# Patient Record
Sex: FEMALE | Race: OTHER | HISPANIC OR LATINO | Employment: UNEMPLOYED | URBAN - METROPOLITAN AREA
[De-identification: names, ages, dates, MRNs, and addresses within clinical notes are randomized per-mention and may not be internally consistent; named-entity substitution may affect disease eponyms.]

---

## 2018-11-17 ENCOUNTER — APPOINTMENT (EMERGENCY)
Dept: RADIOLOGY | Facility: HOSPITAL | Age: 63
End: 2018-11-17
Payer: COMMERCIAL

## 2018-11-17 ENCOUNTER — HOSPITAL ENCOUNTER (EMERGENCY)
Facility: HOSPITAL | Age: 63
Discharge: HOME | End: 2018-11-17
Attending: EMERGENCY MEDICINE | Admitting: EMERGENCY MEDICINE
Payer: COMMERCIAL

## 2018-11-17 ENCOUNTER — APPOINTMENT (EMERGENCY)
Dept: CT IMAGING | Facility: HOSPITAL | Age: 63
End: 2018-11-17
Payer: COMMERCIAL

## 2018-11-17 VITALS
HEIGHT: 64 IN | OXYGEN SATURATION: 97 % | RESPIRATION RATE: 18 BRPM | BODY MASS INDEX: 24.28 KG/M2 | SYSTOLIC BLOOD PRESSURE: 122 MMHG | TEMPERATURE: 97.8 F | HEART RATE: 74 BPM | DIASTOLIC BLOOD PRESSURE: 70 MMHG | WEIGHT: 142.2 LBS

## 2018-11-17 DIAGNOSIS — S01.01XA LACERATION OF SCALP, INITIAL ENCOUNTER: ICD-10-CM

## 2018-11-17 DIAGNOSIS — F10.929 ALCOHOL INTOXICATION (HCC): ICD-10-CM

## 2018-11-17 DIAGNOSIS — S09.90XA INJURY OF HEAD, INITIAL ENCOUNTER: Primary | ICD-10-CM

## 2018-11-17 LAB
ABO GROUP BLD: NORMAL
ANION GAP SERPL CALCULATED.3IONS-SCNC: 11 MMOL/L (ref 4–13)
APTT PPP: 29 SECONDS (ref 26–38)
BASOPHILS # BLD AUTO: 0.04 THOUSANDS/ΜL (ref 0–0.1)
BASOPHILS NFR BLD AUTO: 1 % (ref 0–1)
BLD GP AB SCN SERPL QL: NEGATIVE
BUN SERPL-MCNC: 11 MG/DL (ref 5–25)
CALCIUM SERPL-MCNC: 8.8 MG/DL (ref 8.3–10.1)
CHLORIDE SERPL-SCNC: 101 MMOL/L (ref 100–108)
CO2 SERPL-SCNC: 29 MMOL/L (ref 21–32)
CREAT SERPL-MCNC: 0.54 MG/DL (ref 0.6–1.3)
EOSINOPHIL # BLD AUTO: 0.13 THOUSAND/ΜL (ref 0–0.61)
EOSINOPHIL NFR BLD AUTO: 2 % (ref 0–6)
ERYTHROCYTE [DISTWIDTH] IN BLOOD BY AUTOMATED COUNT: 12.2 % (ref 11.6–15.1)
ETHANOL SERPL-MCNC: 232 MG/DL (ref 0–3)
GFR SERPL CREATININE-BSD FRML MDRD: 101 ML/MIN/1.73SQ M
GLUCOSE SERPL-MCNC: 119 MG/DL (ref 65–140)
HCT VFR BLD AUTO: 40.3 % (ref 34.8–46.1)
HGB BLD-MCNC: 13.6 G/DL (ref 11.5–15.4)
IMM GRANULOCYTES # BLD AUTO: 0.03 THOUSAND/UL (ref 0–0.2)
IMM GRANULOCYTES NFR BLD AUTO: 0 % (ref 0–2)
INR PPP: 0.85 (ref 0.86–1.17)
LYMPHOCYTES # BLD AUTO: 2.34 THOUSANDS/ΜL (ref 0.6–4.47)
LYMPHOCYTES NFR BLD AUTO: 30 % (ref 14–44)
MCH RBC QN AUTO: 33.5 PG (ref 26.8–34.3)
MCHC RBC AUTO-ENTMCNC: 33.7 G/DL (ref 31.4–37.4)
MCV RBC AUTO: 99 FL (ref 82–98)
MONOCYTES # BLD AUTO: 0.75 THOUSAND/ΜL (ref 0.17–1.22)
MONOCYTES NFR BLD AUTO: 10 % (ref 4–12)
NEUTROPHILS # BLD AUTO: 4.58 THOUSANDS/ΜL (ref 1.85–7.62)
NEUTS SEG NFR BLD AUTO: 57 % (ref 43–75)
NRBC BLD AUTO-RTO: 0 /100 WBCS
PLATELET # BLD AUTO: 217 THOUSANDS/UL (ref 149–390)
PMV BLD AUTO: 9.6 FL (ref 8.9–12.7)
POTASSIUM SERPL-SCNC: 3.2 MMOL/L (ref 3.5–5.3)
PROTHROMBIN TIME: 11.6 SECONDS (ref 11.8–14.2)
RBC # BLD AUTO: 4.06 MILLION/UL (ref 3.81–5.12)
RH BLD: POSITIVE
SODIUM SERPL-SCNC: 141 MMOL/L (ref 136–145)
SPECIMEN EXPIRATION DATE: NORMAL
TROPONIN I SERPL-MCNC: <0.02 NG/ML
WBC # BLD AUTO: 7.87 THOUSAND/UL (ref 4.31–10.16)

## 2018-11-17 PROCEDURE — 80320 DRUG SCREEN QUANTALCOHOLS: CPT | Performed by: EMERGENCY MEDICINE

## 2018-11-17 PROCEDURE — 90471 IMMUNIZATION ADMIN: CPT

## 2018-11-17 PROCEDURE — 85730 THROMBOPLASTIN TIME PARTIAL: CPT | Performed by: EMERGENCY MEDICINE

## 2018-11-17 PROCEDURE — 86850 RBC ANTIBODY SCREEN: CPT | Performed by: EMERGENCY MEDICINE

## 2018-11-17 PROCEDURE — 90715 TDAP VACCINE 7 YRS/> IM: CPT | Performed by: EMERGENCY MEDICINE

## 2018-11-17 PROCEDURE — 86900 BLOOD TYPING SEROLOGIC ABO: CPT | Performed by: EMERGENCY MEDICINE

## 2018-11-17 PROCEDURE — G0480 DRUG TEST DEF 1-7 CLASSES: HCPCS | Performed by: EMERGENCY MEDICINE

## 2018-11-17 PROCEDURE — 36415 COLL VENOUS BLD VENIPUNCTURE: CPT | Performed by: EMERGENCY MEDICINE

## 2018-11-17 PROCEDURE — 86901 BLOOD TYPING SEROLOGIC RH(D): CPT | Performed by: EMERGENCY MEDICINE

## 2018-11-17 PROCEDURE — 70450 CT HEAD/BRAIN W/O DYE: CPT

## 2018-11-17 PROCEDURE — 85025 COMPLETE CBC W/AUTO DIFF WBC: CPT | Performed by: EMERGENCY MEDICINE

## 2018-11-17 PROCEDURE — 84484 ASSAY OF TROPONIN QUANT: CPT | Performed by: EMERGENCY MEDICINE

## 2018-11-17 PROCEDURE — 96374 THER/PROPH/DIAG INJ IV PUSH: CPT

## 2018-11-17 PROCEDURE — 85610 PROTHROMBIN TIME: CPT | Performed by: EMERGENCY MEDICINE

## 2018-11-17 PROCEDURE — 71045 X-RAY EXAM CHEST 1 VIEW: CPT

## 2018-11-17 PROCEDURE — 99285 EMERGENCY DEPT VISIT HI MDM: CPT

## 2018-11-17 PROCEDURE — 93005 ELECTROCARDIOGRAM TRACING: CPT

## 2018-11-17 PROCEDURE — 72125 CT NECK SPINE W/O DYE: CPT

## 2018-11-17 PROCEDURE — 80048 BASIC METABOLIC PNL TOTAL CA: CPT | Performed by: EMERGENCY MEDICINE

## 2018-11-17 RX ADMIN — MORPHINE SULFATE 2 MG: 2 INJECTION, SOLUTION INTRAMUSCULAR; INTRAVENOUS at 01:30

## 2018-11-17 RX ADMIN — TETANUS TOXOID, REDUCED DIPHTHERIA TOXOID AND ACELLULAR PERTUSSIS VACCINE, ADSORBED 0.5 ML: 5; 2.5; 8; 8; 2.5 SUSPENSION INTRAMUSCULAR at 01:23

## 2018-11-17 NOTE — PROGRESS NOTES
Called and spoke with the patient's as patient was sleeping in the hotel room air staying at  They live in [place] and her here for the patient's granddaughter stands competition  I received a call from the radiologist explaining that the chest x-ray performed earlier today was abnormal with concern for pulmonary contusion on the left side versus localized pleural effusion versus possible malignancy  Also consider abnormality solely related to poor positioning and rotation of the film  I asked the patient's daughter if she has any chest pain or shortness of breath  The daughter went back to the hotel room and woke her mother up to had been sleeping  The patient denies any chest pain or shortness of breath or discomfort  Will I can hear the patient on the phone answering the mother's questions and she denies any pain or discomfort  I actually asked the daughter to press on the patient's left side of the chest and she denies any pain with this palpation  I did advise if  she develops any chest pain, shortness of breath or discomfort she needs to return to the emergency department  Her sat on her recent visit was 93% but she is a smoker  She denies any symptoms at home now so I think it is okay if she follows up as an outpatient for a repeat chest x-ray in a few days but needs to call on Monday  Explained if they want a chest x-ray copy taking come to the ER and will provide for them, otherwise they can get paper records requested by the PCP  Explained this could possibly be malignancy in the patient is a smoker so if even if no symptoms she would still get a surveillance chest x-ray in a few days that is appropriately positioned and she get a PA and lateral to make sure no malignant findings are pulmonary nodule  Patient is not on blood thinners or anticoagulants  There is no sign of obvious hemothorax but just localized area of opacity  Return if any symptoms  Patient and daughter understand

## 2018-11-17 NOTE — PROGRESS NOTES
Addendum to prior note    Of note the patient fell as an unwitnessed fall, daughter was not around and patient was intoxicated at the time so I am not actually sure if she fell on the left side of the chest

## 2018-11-17 NOTE — ED PROVIDER NOTES
History  Chief Complaint   Patient presents with   Dorothy Carroll Fall     As per EMS pt was drinking vodka and took her night time medications- Xanax, ambien, wellburtin and had a fall  Pt has laceration to back of head  Unknown LOC  Unwitnessed fall from standing, ? LOC, struck head on floor, bleeding from head lac and c/o HA  History limited due to etoh intoxication and coingested medications (see triage note)  Bleeding controlled  Normal prehospital vitals  None       Past Medical History:   Diagnosis Date    Psychiatric disorder     Anxiety, depression       History reviewed  No pertinent surgical history  History reviewed  No pertinent family history  I have reviewed and agree with the history as documented  Social History   Substance Use Topics    Smoking status: Current Every Day Smoker     Packs/day: 0 25    Smokeless tobacco: Never Used    Alcohol use Yes      Comment: "socially"        Review of Systems   Unable to perform ROS: Mental status change       Physical Exam  Physical Exam   Constitutional: She is oriented to person, place, and time  She appears well-developed and well-nourished  No distress  HENT:   Head: Normocephalic  Posterior scalp laceration, no active bleeding   Eyes: Pupils are equal, round, and reactive to light  EOM are normal  Right eye exhibits no discharge  Left eye exhibits no discharge  Neck: Normal range of motion  Neck supple  No JVD present  c spine immobilized w c collar   Cardiovascular: Normal rate, regular rhythm, normal heart sounds and intact distal pulses  Exam reveals no gallop and no friction rub  No murmur heard  Pulmonary/Chest: Effort normal and breath sounds normal  No stridor  No respiratory distress  She has no wheezes  She has no rales  She exhibits no tenderness  Abdominal: Soft  She exhibits no distension and no mass  There is no tenderness  There is no rebound and no guarding  No hernia     Musculoskeletal: Normal range of motion  She exhibits no edema, tenderness or deformity  Neurological: She is alert and oriented to person, place, and time  No sensory deficit  She exhibits normal muscle tone  Skin: Skin is warm and dry  Capillary refill takes less than 2 seconds  She is not diaphoretic  No erythema  No pallor  Nursing note and vitals reviewed        Vital Signs  ED Triage Vitals [11/17/18 0035]   Temperature Pulse Respirations Blood Pressure SpO2   97 7 °F (36 5 °C) 81 20 163/90 93 %      Temp Source Heart Rate Source Patient Position - Orthostatic VS BP Location FiO2 (%)   Oral Monitor Sitting Right arm --      Pain Score       Worst Possible Pain           Vitals:    11/17/18 0035 11/17/18 0040 11/17/18 0050 11/17/18 0140   BP: 163/90 163/90 116/68 122/70   Pulse: 81 81 71 74   Patient Position - Orthostatic VS: Sitting Sitting Lying Lying       Visual Acuity      ED Medications  Medications   tetanus-diphtheria-acellular pertussis (BOOSTRIX) IM injection 0 5 mL (0 5 mL Intramuscular Given 11/17/18 0123)   morphine injection 2 mg (2 mg Intravenous Given 11/17/18 0130)       Diagnostic Studies  Results Reviewed     Procedure Component Value Units Date/Time    Troponin I [[id number]]  (Normal) Collected:  11/17/18 0112    Lab Status:  Final result Specimen:  Blood from Arm, Right Updated:  11/17/18 0141     Troponin I <0 02 ng/mL     Protime-INR [[id number]]  (Abnormal) Collected:  11/17/18 0112    Lab Status:  Final result Specimen:  Blood from Arm, Right Updated:  11/17/18 0134     Protime 11 6 (L) seconds      INR 0 85 (L)    APTT [[id number]]  (Normal) Collected:  11/17/18 0112    Lab Status:  Final result Specimen:  Blood from Arm, Right Updated:  11/17/18 0134     PTT 29 seconds     Ethanol [[id number]]  (Abnormal) Collected:  11/17/18 0112    Lab Status:  Final result Specimen:  Blood from Arm, Right Updated:  11/17/18 0134     Ethanol Lvl 232 (H) mg/dL     Basic metabolic panel [[id number]]  (Abnormal) Collected:  11/17/18 0112    Lab Status:  Final result Specimen:  Blood from Arm, Right Updated:  11/17/18 0133     Sodium 141 mmol/L      Potassium 3 2 (L) mmol/L      Chloride 101 mmol/L      CO2 29 mmol/L      ANION GAP 11 mmol/L      BUN 11 mg/dL      Creatinine 0 54 (L) mg/dL      Glucose 119 mg/dL      Calcium 8 8 mg/dL      eGFR 101 ml/min/1 73sq m     Narrative:         National Kidney Disease Education Program recommendations are as follows:  GFR calculation is accurate only with a steady state creatinine  Chronic Kidney disease less than 60 ml/min/1 73 sq  meters  Kidney failure less than 15 ml/min/1 73 sq  meters  CBC and differential [[id number]]  (Abnormal) Collected:  11/17/18 0112    Lab Status:  Final result Specimen:  Blood from Arm, Right Updated:  11/17/18 0122     WBC 7 87 Thousand/uL      RBC 4 06 Million/uL      Hemoglobin 13 6 g/dL      Hematocrit 40 3 %      MCV 99 (H) fL      MCH 33 5 pg      MCHC 33 7 g/dL      RDW 12 2 %      MPV 9 6 fL      Platelets 407 Thousands/uL      nRBC 0 /100 WBCs      Neutrophils Relative 57 %      Immat GRANS % 0 %      Lymphocytes Relative 30 %      Monocytes Relative 10 %      Eosinophils Relative 2 %      Basophils Relative 1 %      Neutrophils Absolute 4 58 Thousands/µL      Immature Grans Absolute 0 03 Thousand/uL      Lymphocytes Absolute 2 34 Thousands/µL      Monocytes Absolute 0 75 Thousand/µL      Eosinophils Absolute 0 13 Thousand/µL      Basophils Absolute 0 04 Thousands/µL                  XR chest 1 view portable   ED Interpretation by Alina Mcghee MD (11/17 0121)   No traumatic injury      CT spine cervical without contrast   Final Result by Miller Snow MD (11/17 0109)      No cervical spine fracture or traumatic malalignment  Workstation performed: [id number]         CT head without contrast   Final Result by Miller Snow MD (11/17 0056)      No acute intracranial abnormality                    Workstation performed: [id number] Procedures  Lac Repair  Date/Time: 11/17/2018 1:43 AM  Performed by: Randi Parnell  Authorized by: Olivier Barrono D   Consent: Verbal consent obtained  Risks and benefits: risks, benefits and alternatives were discussed  Consent given by: patient  Patient identity confirmed: verbally with patient  Body area: head/neck  Location details: scalp  Laceration length: 2 cm  Foreign bodies: no foreign bodies    Sedation:  Patient sedated: no    Wound Dehiscence:  Superficial Wound Dehiscence: simple closure      Procedure Details:  Skin closure: staples  Number of sutures: 3             Phone Contacts  ED Phone Contact    ED Course  ED Course as of Nov 17 0143   Sat Nov 17, 2018   0120 1:20 AM reexamined, c/o HA, otherwise no pain  Remains conversant  Son at bedside and comfortable w plan to d/c home if imaging OK  MDM  CritCare Time    Disposition  Final diagnoses:   Injury of head, initial encounter   Laceration of scalp, initial encounter   Alcohol intoxication ([facility] [address] )     Time reflects when diagnosis was documented in both MDM as applicable and the Disposition within this note     Time User Action Codes Description Comment    11/17/2018  1:42 AM Sarah Pastrana Add [S09 90XA] Injury of head, initial encounter     11/17/2018  1:42 AM Verdin, Red Hammed Add [S01 01XA] Laceration of scalp, initial encounter     11/17/2018  1:42 AM Verdin, Red Hammed Add [F10 929] Alcohol intoxication [facility])       ED Disposition     ED Disposition Condition Comment    Discharge  Marly Frank discharge to home/self care  Condition at discharge: Stable        Follow-up Information    None         Patient's Medications    No medications on file     No discharge procedures on file      ED Provider  Electronically Signed by           Mendoza Da Silva MD  11/17/18 7318

## 2018-11-17 NOTE — DISCHARGE INSTRUCTIONS
Staple Care   WHAT YOU NEED TO KNOW:   Staples are often used to close a wound  Your staples may be placed for 3 to 14 days, depending on the location of your wound  DISCHARGE INSTRUCTIONS:   Care for your wound:   · Clean:      ¨ You may be able to shower in 24 hours  Do not soak your wound under water  ¨ Gently wash your wound with soap and warm water daily  Lightly pat it dry  Do not cover your wound unless your healthcare provider tells you to  ¨ You may also need to clean your wound with a mixture of hydrogen peroxide and water  Ask how to do this  ¨ Do not apply ointment or cream to the wound unless your healthcare provider tells you to  · Elevate:      ¨ Rest any arm or leg that has a wound on pillows above the level of your heart  Do this as often as possible for 2 days  This will help decrease swelling and pain, and help you heal faster  · Minimize scarring:      ¨ Avoid sunshine on your wound to reduce scarring  Follow up with your healthcare provider as directed: You may need to return for a wound checkup 3 days after your staples are placed  Ask when you should return to get your staples removed  Staple removal:   · A medical staple remover  will be used to take out your staples  Your healthcare provider will slide the tool under each staple, squeeze the handle, and gently pull the staple out  · Medical tape  will be placed on your wound once your staples are removed  This will help keep your wound closed  The medical tape will fall off on its own after several days  Contact your healthcare provider if:   · You have redness, pain, swelling, and pus draining from your wound  · Your pain medicine does not relieve your pain  · You have a fever of 101°F (38 5°C) or higher  · You have an odor coming from your wound  · You have questions or concerns about your condition or care  Return to the emergency department if:   · Your wound reopens      · You have red streaks in your skin that spread out from your wound  · You have severe pain or vomiting  © 2017 [employer] Information is for End User's use only and may not be sold, redistributed or otherwise used for commercial purposes  All illustrations and images included in CareNotes® are the copyrighted property of [employer]  or Agust[employer]  The above information is an  only  It is not intended as medical advice for individual conditions or treatments  Talk to your doctor, nurse or pharmacist before following any medical regimen to see if it is safe and effective for you

## 2018-11-18 LAB
ATRIAL RATE: 71 BPM
P AXIS: 79 DEGREES
PR INTERVAL: 176 MS
QRS AXIS: 58 DEGREES
QRSD INTERVAL: 90 MS
QT INTERVAL: 414 MS
QTC INTERVAL: 449 MS
T WAVE AXIS: 76 DEGREES
VENTRICULAR RATE: 71 BPM

## 2018-11-18 PROCEDURE — 93010 ELECTROCARDIOGRAM REPORT: CPT | Performed by: INTERNAL MEDICINE
